# Patient Record
Sex: FEMALE | Race: WHITE
[De-identification: names, ages, dates, MRNs, and addresses within clinical notes are randomized per-mention and may not be internally consistent; named-entity substitution may affect disease eponyms.]

---

## 2018-03-26 ENCOUNTER — HOSPITAL ENCOUNTER (EMERGENCY)
Dept: HOSPITAL 76 - ED | Age: 58
Discharge: HOME | End: 2018-03-26
Payer: COMMERCIAL

## 2018-03-26 VITALS — SYSTOLIC BLOOD PRESSURE: 127 MMHG | DIASTOLIC BLOOD PRESSURE: 88 MMHG

## 2018-03-26 DIAGNOSIS — N81.4: ICD-10-CM

## 2018-03-26 DIAGNOSIS — B96.89: ICD-10-CM

## 2018-03-26 DIAGNOSIS — N76.0: Primary | ICD-10-CM

## 2018-03-26 DIAGNOSIS — I10: ICD-10-CM

## 2018-03-26 LAB
CLARITY UR REFRACT.AUTO: CLEAR
GLUCOSE UR QL STRIP.AUTO: NEGATIVE MG/DL
KETONES UR QL STRIP.AUTO: NEGATIVE MG/DL
NITRITE UR QL STRIP.AUTO: NEGATIVE
PH UR STRIP.AUTO: 6.5 PH (ref 5–7.5)
PROT UR STRIP.AUTO-MCNC: NEGATIVE MG/DL
RBC # UR STRIP.AUTO: NEGATIVE /UL
SP GR UR STRIP.AUTO: <=1.005 (ref 1–1.03)
UROBILINOGEN UR QL STRIP.AUTO: (no result) E.U./DL
UROBILINOGEN UR STRIP.AUTO-MCNC: NEGATIVE MG/DL

## 2018-03-26 PROCEDURE — 99283 EMERGENCY DEPT VISIT LOW MDM: CPT

## 2018-03-26 PROCEDURE — 81003 URINALYSIS AUTO W/O SCOPE: CPT

## 2018-03-26 PROCEDURE — 87210 SMEAR WET MOUNT SALINE/INK: CPT

## 2018-03-26 PROCEDURE — 87220 TISSUE EXAM FOR FUNGI: CPT

## 2018-03-26 PROCEDURE — 81001 URINALYSIS AUTO W/SCOPE: CPT

## 2018-03-26 PROCEDURE — 87086 URINE CULTURE/COLONY COUNT: CPT

## 2018-03-26 NOTE — ED PHYSICIAN DOCUMENTATION
History of Present Illness





- Stated complaint


Stated Complaint: BLADDER PX





- Chief complaint


Chief Complaint: Abd Pain





- History obtained from


History obtained from: Patient





- History of Present Illness


Timing: How many days ago (several)


Pain level max: 5


Pain level now: 4





- Additonal information


Additional information: 





Patient is a 57-year-old female who presents to the emergency department 

complaining of chronic uterine and bladder prolapse, has had surgery for this 

in the past.  Has noticed increasing vaginal and bladder irritation over the 

past few days as well as a white spot on the posterior aspect of her bladder.  

She is visiting from [place].





Review of Systems


Constitutional: denies: Fever, Chills


Ears: denies: Ear pain


Nose: denies: Rhinorrhea / runny nose, Congestion


Throat: denies: Sore throat


Respiratory: denies: Cough


GI: denies: Nausea, Vomiting, Diarrhea


: denies: Dysuria, Frequency, Hesitancy


Skin: denies: Rash


Musculoskeletal: denies: Neck pain, Back pain


Neurologic: denies: Headache





PD PAST MEDICAL HISTORY





- Past Medical History


Past Medical History: Yes


Cardiovascular: Hypertension


Other Past Medical History: bladder, uterine prolapse





- Past Surgical History


Past Surgical History: Yes


/GYN: Other (bladder sling)





- Present Medications


Home Medications: 


 Ambulatory Orders











 Medication  Instructions  Recorded  Confirmed


 


Lisinopril mg PO 03/26/18 


 


Metronidazole [Flagyl] 500 mg PO BID #14 tablet 03/26/18 


 


Simvastatin mg PO 03/26/18 


 


Temazepam mg PO 03/26/18 














- Allergies


Allergies/Adverse Reactions: 


 Allergies











Allergy/AdvReac Type Severity Reaction Status Date / Time


 


No Known Drug Allergies Allergy   Verified 03/26/18 10:36














- Living Situation


Living Arrangement: reports: At home





- Social History


Does the pt smoke?: No


Does the pt have substance abuse?: No





- Family History


Family history: reports: Non contributory





PD ED PE NORMAL





- Vitals


Vital signs reviewed: Yes





- General


General: Alert and oriented X 3, No acute distress





- Cardiac


Cardiac: RRR





- Respiratory


Respiratory: No respiratory distress, Clear bilaterally





- Abdomen


Abdomen: Soft, Non tender, Non distended





- Female 


Female : Chaperone present (Cristel VILLA ED Tech), Other (Irritation present to the 

vaginal walls with scant white discharge.  No bleeding.  Otherwise normal exam.)





- Derm


Derm: Warm and dry





- Neuro


Neuro: Alert and oriented X 3





- Psych


Psych: Normal mood, Normal affect





Results





- Vitals


Vitals: 


 Vital Signs - 24 hr











  03/26/18 03/26/18





  10:30 14:01


 


Temperature 36.6 C 


 


Heart Rate 85 77


 


Respiratory 16 18





Rate  


 


Blood Pressure 159/82 H 127/88 H


 


O2 Saturation 99 97








 Oxygen











O2 Source                      Room air

















- Labs


Labs: 


 Microbiology











 03/26/18 13:38 Wet Prep - Final





 Vaginal 








 Laboratory Tests











  03/26/18





  10:35


 


Urine Color  YELLOW


 


Urine Clarity  CLEAR


 


Urine pH  6.5


 


Ur Specific Gravity  <=1.005


 


Urine Protein  NEGATIVE


 


Urine Glucose (UA)  NEGATIVE


 


Urine Ketones  NEGATIVE


 


Urine Occult Blood  NEGATIVE


 


Urine Nitrite  NEGATIVE


 


Urine Bilirubin  NEGATIVE


 


Urine Urobilinogen  0.2 (NORMAL)


 


Ur Leukocyte Esterase  NEGATIVE


 


Ur Microscopic Review  NOT INDICATED


 


Urine Culture Comments  NOT INDICATED














PD MEDICAL DECISION MAKING





- ED course


Complexity details: reviewed results, re-evaluated patient, considered 

differential, d/w patient


ED course: 





Patient is a 57-year-old female who presents to the emergency department with 

vaginal pain, appears consistent with bacterial vaginitis clinically.  Will 

place on Flagyl and see how she progresses.  No UTI.  She is well-appearing, 

nontoxic.  Afebrile.  Patient counseled regarding signs and symptoms for which 

I believe and urgent re-evaluation would be necessary. Patient with good 

understanding of and agreement to plan and is comfortable going home at this 

time





This document was made in part using voice recognition software. While efforts 

are made to proofread this document, sound alike and grammatical errors may 

occur.





Departure





- Departure


Disposition: 01 Home, Self Care


Clinical Impression: 


 Bacterial vaginitis





Condition: Good


Instructions:  ED Vaginosis Bacterial


Follow-Up: 


Provider,Other [Primary Care Provider] - Within 1 week


Prescriptions: 


Metronidazole [Flagyl] 500 mg PO BID #14 tablet


Comments: 


Take all Flagyl until gone.  Return if you worsen.  Do not drink alcohol while 

taking the Flagyl as this will make you very ill. 


Discharge Date/Time: 03/26/18 14:12